# Patient Record
Sex: FEMALE | NOT HISPANIC OR LATINO | Employment: STUDENT | URBAN - METROPOLITAN AREA
[De-identification: names, ages, dates, MRNs, and addresses within clinical notes are randomized per-mention and may not be internally consistent; named-entity substitution may affect disease eponyms.]

---

## 2019-05-31 ENCOUNTER — OFFICE VISIT (OUTPATIENT)
Dept: OBSTETRICS AND GYNECOLOGY | Facility: CLINIC | Age: 24
End: 2019-05-31

## 2019-05-31 VITALS
DIASTOLIC BLOOD PRESSURE: 52 MMHG | BODY MASS INDEX: 19.3 KG/M2 | HEART RATE: 66 BPM | HEIGHT: 67 IN | SYSTOLIC BLOOD PRESSURE: 92 MMHG | WEIGHT: 123 LBS

## 2019-05-31 DIAGNOSIS — Z01.419 ENCOUNTER FOR GYNECOLOGICAL EXAMINATION: Primary | ICD-10-CM

## 2019-05-31 DIAGNOSIS — Z12.4 SCREENING FOR CERVICAL CANCER: ICD-10-CM

## 2019-05-31 DIAGNOSIS — Z11.3 SCREEN FOR STD (SEXUALLY TRANSMITTED DISEASE): ICD-10-CM

## 2019-05-31 DIAGNOSIS — N89.8 VAGINAL DISCHARGE: ICD-10-CM

## 2019-05-31 PROCEDURE — 99385 PREV VISIT NEW AGE 18-39: CPT | Performed by: OBSTETRICS & GYNECOLOGY

## 2019-05-31 NOTE — PROGRESS NOTES
"Subjective   Maria Antonia Soto is a 23 y.o. female.   Cc: pt here for annual.   History of Present Illness   Patient is here for her annual exam.  She has never seen a gynecologist before.  She is sexually active.  She uses condoms for contraception.  She does not want to be on birth control.  She has never had a Pap smear before.  Patient thinks she may have had Gardasil many years ago.  She does report intermittent episodes of vaginal discharge.  It seems to fluctuate throughout her cycle.  She says it is not heavy today.  She is otherwise without gynecologic complaints.  Patient denies any family history of breast cancer.  She occasionally performs self breast exams    History reviewed. No pertinent past medical history.    History reviewed. No pertinent surgical history.    Social History     Tobacco Use   • Smoking status: Never Smoker   • Smokeless tobacco: Never Used   Substance Use Topics   • Alcohol use: No     Frequency: Never   • Drug use: No     History reviewed. No pertinent family history.    No current outpatient medications on file prior to visit.     No current facility-administered medications on file prior to visit.      No Known Allergies    Review of Systems  General: No fever or chills  Constitutional: No weight loss or gain, no hair loss  HENT: No headache, no hearing loss, no tinnitus  Eyes: normal vision, no eye pain  Lungs: No cough, no shortness of breath  Heart: No chest pain, no palpitations  Abdomen: No nausea, vomiting, constipation or diarrhea  : No dysuria, no hematuria  Skin: No rashes  Lymph: No swelling  Neuro: No parathesia, no weakness  Psych: Normal though content, no hallucinations, no SI/HI      Objective   Physical Exam  Vitals:    05/31/19 1102   BP: 92/52   Pulse: 66   Weight: 55.8 kg (123 lb)   Height: 170.2 cm (67\")     Patient's last menstrual period was 05/22/2019 (exact date).    Exam performed in the presence of a female chaperone  Patient has provided " verbal consent to proceed with exam.    Gen: No acute distress, awake and oriented times three  HENT: Normocephalic, atraumatic, Moist mucous membranes  Eyes: PERRLA, EOMI  Neck: Supple, normal range of motion, no thyromegaly  Lungs: Normal work of breathing, lungs clear bilaterally, no crackles/wheezes  Heart: Regular rate and rhythm, no murmurs  Breast: Symmetrical. No skin changes or nipple retractions. No lumps or masses bilaterally. No tenderness bilaterally.  Abdomen: soft, nontender, no masses or hernia, no hepatosplenomegaly, non distended, normoactive bowel sounds  Pelvic: Exam performed in the presence of a female chaperone  Patient has provided verbal consent to proceed with exam.  Normal external female genitalia, no lesions  Urethra: Normal meatus, no caruncle  Bladder: nontender  Vagina: No blood or discharge  Cervix: No cervical motion tenderness, no lesions, no active bleeding, nonfriable  Uterus: Anteverted, normal size and shape, nontender  Adnexa: No masses or tenderness  External anal exam: Normal appearance, no lesions or hemorrhoids  Rectal: Deferred  Skin: Warm and dry, no rashes  Psych: Good judgement and insight, normal affect and mood  Neuro: CN 2-12 intact, no gross deficits      Assessment/Plan   Diagnoses and all orders for this visit:    Encounter for gynecological examination  Pap smear performed today.  Discussed importance of condoms for STD prevention.  Offered patient contraception, but she declines.  She will continue to use condoms for contraception as well.  Recommend routine self breast exams.  Discussed need for yearly gynecologic exams.  Encourage the patient to check her vaccination records to be sure that she has been vaccinated with Gardasil.  If not, she may return here for the vaccine series.  Screening for cervical cancer  -     IGP,CtNgTv,rfx Apt HPV All    Screen for STD (sexually transmitted disease)  -     IGP,CtNgTv,rfx Apt HPV All  She is instructed to call our  office for the results if she has not heard them after 1 week.     Vaginal discharge  -     NuSwab BV & Candida - Swab, Vagina    Check cultures, treat as indicated based on culture results

## 2019-06-03 LAB
A VAGINAE DNA VAG QL NAA+PROBE: NORMAL SCORE
BVAB2 DNA VAG QL NAA+PROBE: NORMAL SCORE
C ALBICANS DNA VAG QL NAA+PROBE: NEGATIVE
C GLABRATA DNA VAG QL NAA+PROBE: NEGATIVE
MEGA1 DNA VAG QL NAA+PROBE: NORMAL SCORE

## 2019-06-04 LAB
C TRACH RRNA CVX QL NAA+PROBE: NEGATIVE
CONV .: NORMAL
CYTOLOGIST CVX/VAG CYTO: NORMAL
CYTOLOGY CVX/VAG DOC CYTO: NORMAL
CYTOLOGY CVX/VAG DOC THIN PREP: NORMAL
DX ICD CODE: NORMAL
HIV 1 & 2 AB SER-IMP: NORMAL
N GONORRHOEA RRNA CVX QL NAA+PROBE: NEGATIVE
OTHER STN SPEC: NORMAL
STAT OF ADQ CVX/VAG CYTO-IMP: NORMAL
T VAGINALIS RRNA SPEC QL NAA+PROBE: NEGATIVE

## 2019-06-06 ENCOUNTER — TELEPHONE (OUTPATIENT)
Dept: OBSTETRICS AND GYNECOLOGY | Facility: CLINIC | Age: 24
End: 2019-06-06

## 2019-06-06 NOTE — TELEPHONE ENCOUNTER
Left message to call office. MARTHA      ----- Message from Brayden Turner MD sent at 6/4/2019  4:38 PM EDT -----  Notify the patient that her Pap smear was normal, and her vaginal cultures were completely negative